# Patient Record
Sex: FEMALE | Race: BLACK OR AFRICAN AMERICAN | NOT HISPANIC OR LATINO | Employment: UNEMPLOYED | URBAN - METROPOLITAN AREA
[De-identification: names, ages, dates, MRNs, and addresses within clinical notes are randomized per-mention and may not be internally consistent; named-entity substitution may affect disease eponyms.]

---

## 2017-12-14 ENCOUNTER — TELEPHONE (OUTPATIENT)
Dept: GASTROENTEROLOGY | Facility: CLINIC | Age: 27
End: 2017-12-14

## 2017-12-14 NOTE — TELEPHONE ENCOUNTER
----- Message from Penelopeflynn Lanza sent at 12/14/2017  1:51 PM CST -----  Patients mom states that patient need to see the doctor/NP tomorrow for severe stomach pain.  Please call Leigha Pickering at [phone].

## 2017-12-14 NOTE — TELEPHONE ENCOUNTER
----- Message from Marlon Li sent at 12/14/2017  2:09 PM CST -----  Contact: pt's mother  She's calling in regards to having the pt worked into the ds schedule on tomorrow, [phone] (home)

## 2017-12-16 ENCOUNTER — HOSPITAL ENCOUNTER (OUTPATIENT)
Facility: HOSPITAL | Age: 27
Discharge: HOME | End: 2017-12-18
Attending: EMERGENCY MEDICINE | Admitting: FAMILY MEDICINE
Payer: MEDICAID

## 2017-12-16 DIAGNOSIS — R11.2 INTRACTABLE VOMITING WITH NAUSEA, UNSPECIFIED VOMITING TYPE: Primary | ICD-10-CM

## 2017-12-16 LAB
ALBUMIN SERPL BCP-MCNC: 4.4 G/DL
ALP SERPL-CCNC: 67 U/L
ALT SERPL W/O P-5'-P-CCNC: 19 U/L
AMPHET+METHAMPHET UR QL: NEGATIVE
ANION GAP SERPL CALC-SCNC: 11 MMOL/L
AST SERPL-CCNC: 20 U/L
BARBITURATES UR QL SCN>200 NG/ML: NEGATIVE
BASOPHILS # BLD AUTO: 0 K/UL
BASOPHILS NFR BLD: 0.3 %
BENZODIAZ UR QL SCN>200 NG/ML: NEGATIVE
BILIRUB SERPL-MCNC: 1.1 MG/DL
BUN SERPL-MCNC: 14 MG/DL
BZE UR QL SCN: NEGATIVE
CALCIUM SERPL-MCNC: 9.8 MG/DL
CANNABINOIDS UR QL SCN: NORMAL
CHLORIDE SERPL-SCNC: 105 MMOL/L
CO2 SERPL-SCNC: 22 MMOL/L
CREAT SERPL-MCNC: 1 MG/DL
CREAT UR-MCNC: 37.6 MG/DL
DIFFERENTIAL METHOD: ABNORMAL
EOSINOPHIL # BLD AUTO: 0 K/UL
EOSINOPHIL NFR BLD: 0.1 %
ERYTHROCYTE [DISTWIDTH] IN BLOOD BY AUTOMATED COUNT: 12.9 %
EST. GFR  (AFRICAN AMERICAN): >60 ML/MIN/1.73 M^2
EST. GFR  (NON AFRICAN AMERICAN): >60 ML/MIN/1.73 M^2
GLUCOSE SERPL-MCNC: 119 MG/DL
HCT VFR BLD AUTO: 40.3 %
HGB BLD-MCNC: 13.4 G/DL
LIPASE SERPL-CCNC: 50 U/L
LYMPHOCYTES # BLD AUTO: 1.2 K/UL
LYMPHOCYTES NFR BLD: 9.1 %
MCH RBC QN AUTO: 32.1 PG
MCHC RBC AUTO-ENTMCNC: 33.3 G/DL
MCV RBC AUTO: 97 FL
METHADONE UR QL SCN>300 NG/ML: NEGATIVE
MONOCYTES # BLD AUTO: 0.6 K/UL
MONOCYTES NFR BLD: 4.5 %
NEUTROPHILS # BLD AUTO: 10.9 K/UL
NEUTROPHILS NFR BLD: 86 %
OPIATES UR QL SCN: NEGATIVE
PCP UR QL SCN>25 NG/ML: NEGATIVE
PLATELET # BLD AUTO: 257 K/UL
PMV BLD AUTO: 8.5 FL
POTASSIUM SERPL-SCNC: 4 MMOL/L
PROT SERPL-MCNC: 7.6 G/DL
RBC # BLD AUTO: 4.18 M/UL
SODIUM SERPL-SCNC: 138 MMOL/L
TOXICOLOGY INFORMATION: NORMAL
WBC # BLD AUTO: 12.7 K/UL

## 2017-12-16 PROCEDURE — 36415 COLL VENOUS BLD VENIPUNCTURE: CPT

## 2017-12-16 PROCEDURE — G0378 HOSPITAL OBSERVATION PER HR: HCPCS

## 2017-12-16 PROCEDURE — 25000003 PHARM REV CODE 250: Performed by: NURSE PRACTITIONER

## 2017-12-16 PROCEDURE — 96375 TX/PRO/DX INJ NEW DRUG ADDON: CPT

## 2017-12-16 PROCEDURE — 63600175 PHARM REV CODE 636 W HCPCS: Performed by: EMERGENCY MEDICINE

## 2017-12-16 PROCEDURE — 85025 COMPLETE CBC W/AUTO DIFF WBC: CPT

## 2017-12-16 PROCEDURE — 63600175 PHARM REV CODE 636 W HCPCS: Performed by: FAMILY MEDICINE

## 2017-12-16 PROCEDURE — 80053 COMPREHEN METABOLIC PANEL: CPT

## 2017-12-16 PROCEDURE — 25000003 PHARM REV CODE 250: Performed by: EMERGENCY MEDICINE

## 2017-12-16 PROCEDURE — 96374 THER/PROPH/DIAG INJ IV PUSH: CPT

## 2017-12-16 PROCEDURE — 80307 DRUG TEST PRSMV CHEM ANLYZR: CPT

## 2017-12-16 PROCEDURE — 25000003 PHARM REV CODE 250: Performed by: FAMILY MEDICINE

## 2017-12-16 PROCEDURE — 83690 ASSAY OF LIPASE: CPT

## 2017-12-16 PROCEDURE — 99284 EMERGENCY DEPT VISIT MOD MDM: CPT

## 2017-12-16 PROCEDURE — 96361 HYDRATE IV INFUSION ADD-ON: CPT

## 2017-12-16 RX ORDER — METOCLOPRAMIDE HYDROCHLORIDE 5 MG/ML
10 INJECTION INTRAMUSCULAR; INTRAVENOUS
Status: COMPLETED | OUTPATIENT
Start: 2017-12-16 | End: 2017-12-16

## 2017-12-16 RX ORDER — LANOLIN ALCOHOL/MO/W.PET/CERES
800 CREAM (GRAM) TOPICAL
Status: DISCONTINUED | OUTPATIENT
Start: 2017-12-16 | End: 2017-12-18 | Stop reason: HOSPADM

## 2017-12-16 RX ORDER — POTASSIUM CHLORIDE 20 MEQ/15ML
60 SOLUTION ORAL
Status: DISCONTINUED | OUTPATIENT
Start: 2017-12-16 | End: 2017-12-18 | Stop reason: HOSPADM

## 2017-12-16 RX ORDER — FENTANYL CITRATE 50 UG/ML
50 INJECTION, SOLUTION INTRAMUSCULAR; INTRAVENOUS
Status: COMPLETED | OUTPATIENT
Start: 2017-12-16 | End: 2017-12-16

## 2017-12-16 RX ORDER — ACETAMINOPHEN 10 MG/ML
1000 INJECTION, SOLUTION INTRAVENOUS EVERY 8 HOURS
Status: DISCONTINUED | OUTPATIENT
Start: 2017-12-16 | End: 2017-12-18

## 2017-12-16 RX ORDER — PANTOPRAZOLE SODIUM 40 MG/1
40 TABLET, DELAYED RELEASE ORAL DAILY
Status: DISCONTINUED | OUTPATIENT
Start: 2017-12-17 | End: 2017-12-16

## 2017-12-16 RX ORDER — OMEPRAZOLE 40 MG/1
40 CAPSULE, DELAYED RELEASE ORAL DAILY
Status: ON HOLD | COMMUNITY
End: 2017-12-16

## 2017-12-16 RX ORDER — PANTOPRAZOLE SODIUM 40 MG/1
40 TABLET, DELAYED RELEASE ORAL 2 TIMES DAILY
Status: DISCONTINUED | OUTPATIENT
Start: 2017-12-16 | End: 2017-12-18 | Stop reason: HOSPADM

## 2017-12-16 RX ORDER — IBUPROFEN 200 MG
24 TABLET ORAL
Status: DISCONTINUED | OUTPATIENT
Start: 2017-12-16 | End: 2017-12-18 | Stop reason: HOSPADM

## 2017-12-16 RX ORDER — POTASSIUM CHLORIDE 20 MEQ/15ML
40 SOLUTION ORAL
Status: DISCONTINUED | OUTPATIENT
Start: 2017-12-16 | End: 2017-12-18 | Stop reason: HOSPADM

## 2017-12-16 RX ORDER — SODIUM CHLORIDE 9 MG/ML
INJECTION, SOLUTION INTRAVENOUS CONTINUOUS
Status: DISCONTINUED | OUTPATIENT
Start: 2017-12-16 | End: 2017-12-18 | Stop reason: HOSPADM

## 2017-12-16 RX ORDER — GLUCAGON 1 MG
1 KIT INJECTION
Status: DISCONTINUED | OUTPATIENT
Start: 2017-12-16 | End: 2017-12-18 | Stop reason: HOSPADM

## 2017-12-16 RX ORDER — ACETAMINOPHEN 325 MG/1
650 TABLET ORAL EVERY 8 HOURS PRN
Status: DISCONTINUED | OUTPATIENT
Start: 2017-12-16 | End: 2017-12-18 | Stop reason: HOSPADM

## 2017-12-16 RX ORDER — DIPHENHYDRAMINE HCL 25 MG
50 CAPSULE ORAL NIGHTLY PRN
Status: DISCONTINUED | OUTPATIENT
Start: 2017-12-16 | End: 2017-12-18 | Stop reason: HOSPADM

## 2017-12-16 RX ORDER — IBUPROFEN 200 MG
16 TABLET ORAL
Status: DISCONTINUED | OUTPATIENT
Start: 2017-12-16 | End: 2017-12-18 | Stop reason: HOSPADM

## 2017-12-16 RX ORDER — LORAZEPAM 2 MG/ML
1 INJECTION INTRAMUSCULAR
Status: COMPLETED | OUTPATIENT
Start: 2017-12-16 | End: 2017-12-16

## 2017-12-16 RX ORDER — DIPHENHYDRAMINE HYDROCHLORIDE 50 MG/ML
12.5 INJECTION INTRAMUSCULAR; INTRAVENOUS
Status: COMPLETED | OUTPATIENT
Start: 2017-12-16 | End: 2017-12-16

## 2017-12-16 RX ORDER — METOCLOPRAMIDE HYDROCHLORIDE 5 MG/ML
10 INJECTION INTRAMUSCULAR; INTRAVENOUS EVERY 6 HOURS PRN
Status: DISCONTINUED | OUTPATIENT
Start: 2017-12-16 | End: 2017-12-18 | Stop reason: HOSPADM

## 2017-12-16 RX ORDER — SUCRALFATE 1 G/1
1 TABLET ORAL
Status: DISCONTINUED | OUTPATIENT
Start: 2017-12-16 | End: 2017-12-18 | Stop reason: HOSPADM

## 2017-12-16 RX ORDER — ONDANSETRON 2 MG/ML
8 INJECTION INTRAMUSCULAR; INTRAVENOUS EVERY 8 HOURS PRN
Status: DISCONTINUED | OUTPATIENT
Start: 2017-12-16 | End: 2017-12-18 | Stop reason: HOSPADM

## 2017-12-16 RX ORDER — HYDROCODONE BITARTRATE AND ACETAMINOPHEN 5; 325 MG/1; MG/1
1 TABLET ORAL EVERY 4 HOURS PRN
Status: DISCONTINUED | OUTPATIENT
Start: 2017-12-16 | End: 2017-12-18 | Stop reason: HOSPADM

## 2017-12-16 RX ORDER — KETOROLAC TROMETHAMINE 30 MG/ML
30 INJECTION, SOLUTION INTRAMUSCULAR; INTRAVENOUS EVERY 6 HOURS PRN
Status: DISCONTINUED | OUTPATIENT
Start: 2017-12-16 | End: 2017-12-16

## 2017-12-16 RX ADMIN — SODIUM CHLORIDE 1000 ML: 0.9 INJECTION, SOLUTION INTRAVENOUS at 11:12

## 2017-12-16 RX ADMIN — ACETAMINOPHEN 1000 MG: 10 INJECTION, SOLUTION INTRAVENOUS at 09:12

## 2017-12-16 RX ADMIN — DIPHENHYDRAMINE HYDROCHLORIDE 50 MG: 25 CAPSULE ORAL at 10:12

## 2017-12-16 RX ADMIN — SUCRALFATE 1 G: 1 TABLET ORAL at 09:12

## 2017-12-16 RX ADMIN — DIPHENHYDRAMINE HYDROCHLORIDE 12.5 MG: 50 INJECTION, SOLUTION INTRAMUSCULAR; INTRAVENOUS at 11:12

## 2017-12-16 RX ADMIN — HYDROCODONE BITARTRATE AND ACETAMINOPHEN 1 TABLET: 5; 325 TABLET ORAL at 09:12

## 2017-12-16 RX ADMIN — PANTOPRAZOLE SODIUM 40 MG: 40 TABLET, DELAYED RELEASE ORAL at 09:12

## 2017-12-16 RX ADMIN — LORAZEPAM 1 MG: 2 INJECTION, SOLUTION INTRAMUSCULAR; INTRAVENOUS at 11:12

## 2017-12-16 RX ADMIN — SODIUM CHLORIDE: 0.9 INJECTION, SOLUTION INTRAVENOUS at 10:12

## 2017-12-16 RX ADMIN — ONDANSETRON 8 MG: 2 INJECTION INTRAMUSCULAR; INTRAVENOUS at 08:12

## 2017-12-16 RX ADMIN — METOCLOPRAMIDE 10 MG: 5 INJECTION, SOLUTION INTRAMUSCULAR; INTRAVENOUS at 11:12

## 2017-12-16 RX ADMIN — Medication 50 MCG: at 02:12

## 2017-12-16 NOTE — SUBJECTIVE & OBJECTIVE
Past Medical History:   Diagnosis Date    Anxiety     History of stomach ulcers        Past Surgical History:   Procedure Laterality Date    BREAST SURGERY      KNEE SURGERY      UPPER GASTROINTESTINAL ENDOSCOPY  2016    Dr. Hernández       Review of patient's allergies indicates:  No Known Allergies    No current facility-administered medications on file prior to encounter.      Current Outpatient Prescriptions on File Prior to Encounter   Medication Sig    esomeprazole (NEXIUM) 40 MG capsule Take 1 capsule (40 mg total) by mouth once daily.    hydrocodone-acetaminophen 7.5-325mg (NORCO) 7.5-325 mg per tablet Take 1 tablet by mouth every 4 (four) hours as needed for Pain.    pantoprazole (PROTONIX) 40 MG tablet Take 1 tablet (40 mg total) by mouth before breakfast.    promethazine (PHENERGAN) 25 MG tablet Take 1 tablet (25 mg total) by mouth every 6 (six) hours as needed for Nausea.     Family History     None        Social History Main Topics    Smoking status: Never Smoker    Smokeless tobacco: Never Used    Alcohol use Yes      Comment: occaisionally    Drug use: Unknown    Sexual activity: Not on file     Review of Systems   Constitutional: Negative for activity change, appetite change, chills, diaphoresis, fatigue and fever.   HENT: Negative for congestion, sinus pressure, sore throat and tinnitus.    Eyes: Negative for visual disturbance.   Respiratory: Negative for cough, chest tightness, shortness of breath and wheezing.    Cardiovascular: Positive for chest pain (substernal). Negative for palpitations and leg swelling.   Gastrointestinal: Positive for abdominal pain, nausea and vomiting. Negative for abdominal distention and diarrhea.   Endocrine: Negative for polydipsia, polyphagia and polyuria.   Genitourinary: Negative for dysuria.   Musculoskeletal: Negative for arthralgias and back pain.   Skin: Negative for rash and wound.   Neurological: Negative for dizziness, syncope, light-headedness  and headaches.   Psychiatric/Behavioral: Negative for confusion.     Objective:     Vital Signs (Most Recent):  Temp: 99.4 °F (37.4 °C) (12/16/17 1620)  Pulse: (!) 55 (12/16/17 1620)  Resp: 16 (12/16/17 1620)  BP: (!) 106/55 (12/16/17 1620)  SpO2: 98 % (12/16/17 1620) Vital Signs (24h Range):  Temp:  [97.8 °F (36.6 °C)-99.4 °F (37.4 °C)] 99.4 °F (37.4 °C)  Pulse:  [50-84] 55  Resp:  [16-20] 16  SpO2:  [96 %-100 %] 98 %  BP: (106-146)/(52-73) 106/55     Weight: 64.9 kg (143 lb)  Body mass index is 23.08 kg/m².    Physical Exam   Constitutional: She appears well-developed and well-nourished. She is cooperative.  Non-toxic appearance. She does not have a sickly appearance. She does not appear ill. No distress.   HENT:   Head: Normocephalic and atraumatic.   Right Ear: External ear normal.   Left Ear: External ear normal.   Nose: Nose normal.   Mouth/Throat: Uvula is midline, oropharynx is clear and moist and mucous membranes are normal.   Eyes: Conjunctivae, EOM and lids are normal. Pupils are equal, round, and reactive to light.   Neck: Trachea normal, normal range of motion and full passive range of motion without pain. Neck supple. No JVD present. No thyroid mass present.   Cardiovascular: Normal rate, regular rhythm, S1 normal, S2 normal and normal heart sounds.    Pulmonary/Chest: Effort normal and breath sounds normal.   Abdominal: Soft. Normal appearance and bowel sounds are normal. She exhibits no distension. There is no tenderness.   Musculoskeletal:        Right lower leg: She exhibits no edema.        Left lower leg: She exhibits no edema.   Neurological: She is alert. She has normal strength. No cranial nerve deficit or sensory deficit. She displays a negative Romberg sign.   Skin: Skin is intact. No cyanosis.   Psychiatric: She has a normal mood and affect. Her speech is normal and behavior is normal. Thought content normal. Cognition and memory are normal.   Nursing note and vitals  reviewed.        CRANIAL NERVES     CN III, IV, VI   Pupils are equal, round, and reactive to light.  Extraocular motions are normal.        Significant Labs: All pertinent labs within the past 24 hours have been reviewed.    Significant Imaging: none

## 2017-12-16 NOTE — HPI
Cynthia Jaimes is a 26 y.o. female with a PMHx of anxiety, gastritis and intermittent intractable nausea and vomiting who presents to the ED c/o nausea and vomiting. Her father states she moved to [place] and is in town visiting. She has previously had symptoms like this in the past but has not followed up with a gastroenterologist. She states she tried to get an appt in [facility] but there was not any availability for a couple months. She has presented to the ED several times this week with the same symptoms. She states her symptoms became worse this morning. This problem started a little over 1 year ago. She has continued to have symptoms daily since. Every morning after a bowel movement she has n/v or excessive dry heaving and severe burning in the middle of her chest (from her epigastric area to her throat). Her vomitus is bilious.  If she does not have a BM, she has bad belly pain until she goes. She denies nocturnal symptoms. Hot showers improve her symptoms. Her symptom then were attributed to her cannabis use. She has yet to stop her daily habit. Neither has she stopped momentarily to see if it would alleviate her symptoms. She states her marijuana use helps her to feel better. She denies EtoH use. She does not smoke cigarettes or use drugs.

## 2017-12-16 NOTE — ED NOTES
Here for eval of acute on chronic epigastric pain with assoc N&V (no diarrhea) - seen 2 days ago @ [facility]. Writhing/moaning on arrival

## 2017-12-16 NOTE — ED PROVIDER NOTES
Encounter Date: 12/16/2017    SCRIBE #1 NOTE: IMason, am scribing for, and in the presence of, Dr. Thomas.       History     Chief Complaint   Patient presents with    Abdominal Pain     Started this am, Hx of ulcers    Nausea       12/16/2017 11:23 AM     Chief complaint: nausea and vomiting      Cynthia Jaimes is a 26 y.o. female with a PMHx of anxiety and stomach ulcers and intermittent intractable nausea and vomiting who presents to the ED accompanied by her father c/o nausea and vomiting. Her father states she moved to [place] and is in town visiting. She has previously had symptoms like this in the past but has not followed up with a gastroenterologist. She states she tried to get an appt but there was not any availability for a couple months. She states her symptoms became worse this morning. She denies having any pain.      The history is provided by the patient and a parent. The history is limited by the condition of the patient.     Review of patient's allergies indicates:  No Known Allergies  Past Medical History:   Diagnosis Date    Anxiety     History of stomach ulcers      Past Surgical History:   Procedure Laterality Date    BREAST SURGERY      KNEE SURGERY      UPPER GASTROINTESTINAL ENDOSCOPY  2016    Dr. Hernández     Family History   Problem Relation Age of Onset    Colon cancer Neg Hx     Colon polyps Neg Hx     Crohn's disease Neg Hx     Ulcerative colitis Neg Hx     Stomach cancer Neg Hx     Esophageal cancer Neg Hx      Social History   Substance Use Topics    Smoking status: Never Smoker    Smokeless tobacco: Never Used    Alcohol use Yes      Comment: occaisionally     Review of Systems   Constitutional: Negative for fever.   HENT: Negative for sore throat.    Respiratory: Negative for shortness of breath.    Cardiovascular: Negative for chest pain.   Gastrointestinal: Positive for nausea and vomiting. Negative for abdominal pain.   Genitourinary: Negative for  dysuria and flank pain.   Musculoskeletal: Negative for back pain.   Skin: Negative for rash.   Neurological: Negative for weakness.   Hematological: Does not bruise/bleed easily.   All other systems reviewed and are negative.      Physical Exam     Initial Vitals [12/16/17 1102]   BP Pulse Resp Temp SpO2   111/65 (!) 50 20 97.8 °F (36.6 °C) 100 %      MAP       80.33         Physical Exam    Nursing note and vitals reviewed.  Constitutional: She appears well-developed and well-nourished. She is not diaphoretic. She appears distressed.   writhing and groaning on the bed   HENT:   Head: Normocephalic and atraumatic.   Eyes: EOM are normal.   Neck: Normal range of motion. Neck supple.   Cardiovascular: Normal rate, regular rhythm and normal heart sounds. Exam reveals no gallop and no friction rub.    No murmur heard.  Pulmonary/Chest: Breath sounds normal. No respiratory distress. She has no wheezes. She has no rhonchi. She has no rales.   Abdominal: She exhibits no distension. There is tenderness (epigastric). There is no rebound.   Mild epigastric   Musculoskeletal: Normal range of motion.   Neurological: She is alert and oriented to person, place, and time.   Skin: Skin is warm and dry.         ED Course   Procedures  Labs Reviewed   CBC W/ AUTO DIFFERENTIAL   COMPREHENSIVE METABOLIC PANEL   LIPASE             Medical Decision Making:   History:   Old Medical Records: I decided to obtain old medical records.  Clinical Tests:   Lab Tests: Ordered and Reviewed            Scribe Attestation:   Scribe #1: I performed the above scribed service and the documentation accurately describes the services I performed. I attest to the accuracy of the note.    I, Dr. Thomas, personally performed the services described in this documentation. All medical record entries made by the scribe were at my direction and in my presence.  I have reviewed the chart and agree that the record reflects my personal performance and is accurate  and complete.3:54 PM 12/16/2017            ED Course as of Dec 16 1553   Sat Dec 16, 2017   1300 Dr turner to admit for intractable NV, 3rd ER visit in 4 days  [EF]   1302 Abd soft non tender not peritoneal  [EF]      ED Course User Index  [EF] Francisco Thomas MD     Clinical Impression:   There were no encounter diagnoses.          26-year-old female with a history of anxiety and stomach ulcers and gastritis presents to the emergency department complaining of nausea and vomiting today.  Actively vomiting on arrival.  Retching, groaning and writhing on the gurney.  Symptoms have improved after IV meds in the emergency room.  In light of the fact this is her third visit in 4 days to the emergency room she'll be admitted.  If discharged she will be very likely just a bounce back again.  Abdominal exam is benign without any peritoneal findings.  Recent negative pregnancy test at [facility].                 Francisco Thomas MD  12/16/17 9069

## 2017-12-17 ENCOUNTER — ANESTHESIA (OUTPATIENT)
Dept: ENDOSCOPY | Facility: HOSPITAL | Age: 27
End: 2017-12-17
Payer: MEDICAID

## 2017-12-17 ENCOUNTER — ANESTHESIA EVENT (OUTPATIENT)
Dept: ENDOSCOPY | Facility: HOSPITAL | Age: 27
End: 2017-12-17
Payer: MEDICAID

## 2017-12-17 ENCOUNTER — SURGERY (OUTPATIENT)
Age: 27
End: 2017-12-17

## 2017-12-17 PROCEDURE — G0378 HOSPITAL OBSERVATION PER HR: HCPCS

## 2017-12-17 PROCEDURE — 63600175 PHARM REV CODE 636 W HCPCS: Performed by: NURSE PRACTITIONER

## 2017-12-17 PROCEDURE — 27201012 HC FORCEPS, HOT/COLD, DISP: Performed by: INTERNAL MEDICINE

## 2017-12-17 PROCEDURE — 37000008 HC ANESTHESIA 1ST 15 MINUTES: Performed by: INTERNAL MEDICINE

## 2017-12-17 PROCEDURE — 63600175 PHARM REV CODE 636 W HCPCS: Performed by: FAMILY MEDICINE

## 2017-12-17 PROCEDURE — 37000009 HC ANESTHESIA EA ADD 15 MINS: Performed by: INTERNAL MEDICINE

## 2017-12-17 PROCEDURE — D9220A PRA ANESTHESIA: Mod: ANES,,, | Performed by: ANESTHESIOLOGY

## 2017-12-17 PROCEDURE — 25000003 PHARM REV CODE 250: Performed by: FAMILY MEDICINE

## 2017-12-17 PROCEDURE — 00731 ANES UPR GI NDSC PX NOS: CPT | Performed by: INTERNAL MEDICINE

## 2017-12-17 PROCEDURE — 43239 EGD BIOPSY SINGLE/MULTIPLE: CPT | Mod: ,,, | Performed by: INTERNAL MEDICINE

## 2017-12-17 PROCEDURE — 88342 IMHCHEM/IMCYTCHM 1ST ANTB: CPT | Mod: 26,,, | Performed by: PATHOLOGY

## 2017-12-17 PROCEDURE — 88305 TISSUE EXAM BY PATHOLOGIST: CPT | Performed by: PATHOLOGY

## 2017-12-17 PROCEDURE — 99214 OFFICE O/P EST MOD 30 MIN: CPT | Mod: 25,,, | Performed by: INTERNAL MEDICINE

## 2017-12-17 PROCEDURE — 63600175 PHARM REV CODE 636 W HCPCS: Performed by: NURSE ANESTHETIST, CERTIFIED REGISTERED

## 2017-12-17 PROCEDURE — 43239 EGD BIOPSY SINGLE/MULTIPLE: CPT | Performed by: INTERNAL MEDICINE

## 2017-12-17 PROCEDURE — D9220A PRA ANESTHESIA: Mod: CRNA,,, | Performed by: NURSE ANESTHETIST, CERTIFIED REGISTERED

## 2017-12-17 PROCEDURE — 00740 HC ANESTHESIA EA ADD 15 MINS: CPT | Performed by: INTERNAL MEDICINE

## 2017-12-17 PROCEDURE — 00740 HC ANESTHESIA 1ST 15 MINUTES: CPT | Performed by: INTERNAL MEDICINE

## 2017-12-17 PROCEDURE — 25000003 PHARM REV CODE 250: Performed by: NURSE ANESTHETIST, CERTIFIED REGISTERED

## 2017-12-17 RX ORDER — PROPOFOL 10 MG/ML
VIAL (ML) INTRAVENOUS
Status: DISCONTINUED | OUTPATIENT
Start: 2017-12-17 | End: 2017-12-17

## 2017-12-17 RX ORDER — LIDOCAINE HYDROCHLORIDE 10 MG/ML
INJECTION, SOLUTION EPIDURAL; INFILTRATION; INTRACAUDAL; PERINEURAL
Status: COMPLETED
Start: 2017-12-17 | End: 2017-12-17

## 2017-12-17 RX ORDER — KETAMINE HYDROCHLORIDE 10 MG/ML
INJECTION, SOLUTION INTRAMUSCULAR; INTRAVENOUS
Status: DISCONTINUED | OUTPATIENT
Start: 2017-12-17 | End: 2017-12-17

## 2017-12-17 RX ORDER — PROPOFOL 10 MG/ML
INJECTION, EMULSION INTRAVENOUS
Status: COMPLETED
Start: 2017-12-17 | End: 2017-12-17

## 2017-12-17 RX ORDER — LIDOCAINE HYDROCHLORIDE 10 MG/ML
INJECTION, SOLUTION EPIDURAL; INFILTRATION; INTRACAUDAL; PERINEURAL
Status: DISCONTINUED | OUTPATIENT
Start: 2017-12-17 | End: 2017-12-17

## 2017-12-17 RX ORDER — KETAMINE HYDROCHLORIDE 100 MG/ML
INJECTION, SOLUTION INTRAMUSCULAR; INTRAVENOUS
Status: DISCONTINUED
Start: 2017-12-17 | End: 2017-12-18 | Stop reason: HOSPADM

## 2017-12-17 RX ADMIN — PROPOFOL 50 MG: 10 INJECTION, EMULSION INTRAVENOUS at 03:12

## 2017-12-17 RX ADMIN — PANTOPRAZOLE SODIUM 40 MG: 40 TABLET, DELAYED RELEASE ORAL at 08:12

## 2017-12-17 RX ADMIN — METOCLOPRAMIDE 10 MG: 5 INJECTION, SOLUTION INTRAMUSCULAR; INTRAVENOUS at 01:12

## 2017-12-17 RX ADMIN — PROPOFOL 30 MG: 10 INJECTION, EMULSION INTRAVENOUS at 03:12

## 2017-12-17 RX ADMIN — LIDOCAINE HYDROCHLORIDE: 20 SOLUTION ORAL; TOPICAL at 08:12

## 2017-12-17 RX ADMIN — ACETAMINOPHEN 1000 MG: 10 INJECTION, SOLUTION INTRAVENOUS at 05:12

## 2017-12-17 RX ADMIN — PROMETHAZINE HYDROCHLORIDE 25 MG: 25 INJECTION INTRAMUSCULAR; INTRAVENOUS at 06:12

## 2017-12-17 RX ADMIN — PROPOFOL 100 MG: 10 INJECTION, EMULSION INTRAVENOUS at 03:12

## 2017-12-17 RX ADMIN — SUCRALFATE 1 G: 1 TABLET ORAL at 05:12

## 2017-12-17 RX ADMIN — PROMETHAZINE HYDROCHLORIDE 12.5 MG: 25 INJECTION INTRAMUSCULAR; INTRAVENOUS at 01:12

## 2017-12-17 RX ADMIN — LIDOCAINE HYDROCHLORIDE 100 MG: 10 INJECTION, SOLUTION EPIDURAL; INFILTRATION; INTRACAUDAL; PERINEURAL at 03:12

## 2017-12-17 RX ADMIN — METOCLOPRAMIDE 10 MG: 5 INJECTION, SOLUTION INTRAMUSCULAR; INTRAVENOUS at 08:12

## 2017-12-17 RX ADMIN — METOCLOPRAMIDE 10 MG: 5 INJECTION, SOLUTION INTRAMUSCULAR; INTRAVENOUS at 10:12

## 2017-12-17 RX ADMIN — PROPOFOL 40 MG: 10 INJECTION, EMULSION INTRAVENOUS at 03:12

## 2017-12-17 RX ADMIN — KETAMINE HYDROCHLORIDE 20 MG: 10 INJECTION, SOLUTION INTRAMUSCULAR; INTRAVENOUS at 03:12

## 2017-12-17 RX ADMIN — ONDANSETRON 8 MG: 2 INJECTION INTRAMUSCULAR; INTRAVENOUS at 05:12

## 2017-12-17 NOTE — CONSULTS
25 yo female admitted with epigastric pain and N/V. Symptoms are chronic, dating back past year. Pt describes daily N/V in AM, usually assoc with upper abd pain. No bleeding. No fever or jaundice. Intermittent diarrhea. Weight stable. Not pregnant. Positive reflux symptoms. Positive dyspepsia and belching episodes. Similar symptoms last year, EGD 12/2016 gastritis. U/S 12/2016 negative. Pt taking prilosec without benefit. Smokes marijuana.     Past Medical History:   Diagnosis Date    Anxiety     History of stomach ulcers      Past Surgical History:   Procedure Laterality Date    BREAST SURGERY      KNEE SURGERY Bilateral     UPPER GASTROINTESTINAL ENDOSCOPY  2016    Dr. Hernández     Scheduled Meds:   acetaminophen  1,000 mg Intravenous Q8H    ketamine        pantoprazole  40 mg Oral BID    sucralfate  1 g Oral QID (AC & HS)     Continuous Infusions:   sodium chloride 0.9% Stopped (12/17/17 1607)     PRN Meds:.(pyxis) gi cocktail (mylanta 30 mL, lidocaine 2 % viscous 10 mL, dicyclomine 10 mL) 50 mL, acetaminophen, dextrose 50%, dextrose 50%, diphenhydrAMINE, glucagon (human recombinant), glucose, glucose, hydrocodone-acetaminophen 5-325mg, magnesium oxide, magnesium oxide, metoclopramide HCl, ondansetron, potassium chloride 10%, potassium chloride 10%, potassium chloride 10%, promethazine (PHENERGAN) IVPB  Review of patient's allergies indicates:  No Known Allergies  Social History     Social History    Marital status: Single     Spouse name: N/A    Number of children: N/A    Years of education: N/A     Occupational History    Not on file.     Social History Main Topics    Smoking status: Never Smoker    Smokeless tobacco: Never Used    Alcohol use Yes      Comment: occaisionally    Drug use: Unknown    Sexual activity: Not on file     Other Topics Concern    Not on file     Social History Narrative    No narrative on file     Family History   Problem Relation Age of Onset    Colon cancer Neg Hx      Colon polyps Neg Hx     Crohn's disease Neg Hx     Ulcerative colitis Neg Hx     Stomach cancer Neg Hx     Esophageal cancer Neg Hx      REVIEW OF SYSTEMS:   Constitutional: Negative for fever, appetite change and unexpected weight change.  HENT: Negative for sore throat and trouble swallowing.  Eyes: Negative for visual disturbance.  Respiratory: Negative for chest tightness, shortness of breath and wheezing.  Cardiovascular: Negative for chest pain.  Gastrointestinal:  as per HPI  Genitourinary: Negative for dysuria, frequency and hematuria.  Musculoskeletal: Negative for myalgias, joint swelling and arthralgias.  Skin: Negative for color change and rash.   Neurological: Negative for syncope, weakness and headaches.  Psychiatric/Behavioral: Negative for confusion.    PHYSICAL EXAMINATION:                                                        GENERAL:  Comfortable, in no acute distress.      SKIN: Non-jaundiced.                             HEENT EXAM:  Nonicteric.  No adenopathy.  Oropharynx is clear.               NECK:  Supple.                                                               LUNGS:  Clear.                                                               CARDIAC:  Regular rate and rhythm.  S1, S2.  No murmur.                      ABDOMEN:  Soft, positive bowel sounds, nontender.  No hepatosplenomegaly or masses.  No rebound or guarding.                                             EXTREMITIES:  No edema.     NEURO: CN II-XII intact; motor/sensory non-focal.    LABS: Reviewed by me.    RADIOLOGY: Reviewed by me.    IMP: 1. Chronic epigastric pain          2. Chronic N/V          3. Chronic GERD symptoms          4. Chronic dyspepsia          5. Hx anxiety    REC: 1. EGD            2. Abd/GB U/S            3. Further recommendations following above.

## 2017-12-17 NOTE — ANESTHESIA POSTPROCEDURE EVALUATION
"Anesthesia Post Evaluation    Patient: Cynthia Jaimes    Procedure(s) Performed: Procedure(s) (LRB):  ESOPHAGOGASTRODUODENOSCOPY (EGD) (N/A)    Final Anesthesia Type: general  Patient location during evaluation: PACU  Patient participation: Yes- Able to Participate  Level of consciousness: awake and alert and oriented  Post-procedure vital signs: reviewed and stable  Pain management: adequate  Airway patency: patent  PONV status at discharge: No PONV  Anesthetic complications: no      Cardiovascular status: blood pressure returned to baseline and stable  Respiratory status: unassisted and spontaneous ventilation  Hydration status: euvolemic  Follow-up not needed.        Visit Vitals  BP (!) 105/56   Pulse (!) 58   Temp 36.9 °C (98.4 °F) (Temporal)   Resp 16   Ht 5' 6" (1.676 m)   Wt 63.5 kg (140 lb)   LMP 12/10/2017 (Exact Date)   SpO2 98%   Breastfeeding? No   BMI 22.60 kg/m²       Pain/Alondra Score: Pain Assessment Performed: Yes (12/17/2017  3:31 PM)  Presence of Pain: denies (12/17/2017  4:02 PM)  Pain Rating Prior to Med Admin: 7 (12/17/2017  5:55 AM)  Pain Rating Post Med Admin: 8 (12/17/2017  9:16 AM)  Alondra Score: 9 (12/17/2017  4:02 PM)      "

## 2017-12-17 NOTE — SUBJECTIVE & OBJECTIVE
Interval History: stable overnight. The pain is somewhat better but not much. Made NPO by GI    Review of Systems   Constitutional: Negative for chills and fever.   Respiratory: Negative for shortness of breath.    Cardiovascular: Negative for chest pain.   Gastrointestinal: Positive for abdominal pain (burning) and nausea.   Genitourinary: Negative for dysuria.   Neurological: Negative for headaches.   Psychiatric/Behavioral: Negative for confusion.     Objective:     Vital Signs (Most Recent):  Temp: 98 °F (36.7 °C) (12/17/17 0759)  Pulse: (!) 51 (12/17/17 0759)  Resp: 18 (12/17/17 0759)  BP: (!) 141/90 (12/17/17 0759)  SpO2: 98 % (12/17/17 0759) Vital Signs (24h Range):  Temp:  [96.1 °F (35.6 °C)-99.4 °F (37.4 °C)] 98 °F (36.7 °C)  Pulse:  [51-84] 51  Resp:  [16-19] 18  SpO2:  [96 %-100 %] 98 %  BP: ()/(47-90) 141/90     Weight: 63.5 kg (140 lb)  Body mass index is 22.6 kg/m².    Intake/Output Summary (Last 24 hours) at 12/17/17 1133  Last data filed at 12/17/17 0600   Gross per 24 hour   Intake           1087.5 ml   Output                0 ml   Net           1087.5 ml      Physical Exam   Constitutional: She appears well-developed and well-nourished. No distress.   HENT:   Head: Normocephalic and atraumatic.   Eyes: Conjunctivae are normal.   Neck: Neck supple. No JVD present.   Cardiovascular: Normal rate, regular rhythm and normal heart sounds.    Pulmonary/Chest: Effort normal and breath sounds normal.   Abdominal: Soft. Bowel sounds are normal. She exhibits no distension. There is no tenderness.   Musculoskeletal: She exhibits no edema.   Neurological: She is alert.   Psychiatric: She has a normal mood and affect. Her behavior is normal.   Nursing note and vitals reviewed.      Significant Labs: All pertinent labs within the past 24 hours have been reviewed.    Significant Imaging: I have reviewed all pertinent imaging results/findings within the past 24 hours.

## 2017-12-17 NOTE — H&P
[facility] Medicine  History & Physical    Patient Name: Cynthia Jaimes  MRN: [id number]  Admission Date: 12/16/2017  Attending Physician: Criselda Cohen MD  Primary Care Provider: Alisia Low MD     Patient information was obtained from patient, past medical records and ER records.     Subjective:     Principal Problem:Intractable vomiting with nausea    Chief Complaint:   Chief Complaint   Patient presents with    Abdominal Pain     Started this am, Hx of ulcers    Nausea        HPI: Cynthia Jaimes is a 26 y.o. female with a PMHx of anxiety, gastritis and intermittent intractable nausea and vomiting who presents to the ED c/o nausea and vomiting. Her father states she moved to [place] and is in town visiting. She has previously had symptoms like this in the past but has not followed up with a gastroenterologist. She states she tried to get an appt in [facility] but there was not any availability for a couple months. She has presented to the ED several times this week with the same symptoms. She states her symptoms became worse this morning. This problem started a little over 1 year ago. She has continued to have symptoms daily since. Every morning after a bowel movement she has n/v or excessive dry heaving and severe burning in the middle of her chest (from her epigastric area to her throat). Her vomitus is bilious.  If she does not have a BM, she has bad belly pain until she goes. She denies nocturnal symptoms. Hot showers improve her symptoms. Her symptom then were attributed to her cannabis use. She has yet to stop her daily habit. Neither has she stopped momentarily to see if it would alleviate her symptoms. She states her marijuana use helps her to feel better. She denies EtoH use. She does not smoke cigarettes or use drugs.        Past Medical History:   Diagnosis Date    Anxiety     History of stomach ulcers        Past Surgical History:   Procedure  Laterality Date    BREAST SURGERY      KNEE SURGERY      UPPER GASTROINTESTINAL ENDOSCOPY  2016    Dr. Hernández       Review of patient's allergies indicates:  No Known Allergies    No current facility-administered medications on file prior to encounter.      Current Outpatient Prescriptions on File Prior to Encounter   Medication Sig    esomeprazole (NEXIUM) 40 MG capsule Take 1 capsule (40 mg total) by mouth once daily.    hydrocodone-acetaminophen 7.5-325mg (NORCO) 7.5-325 mg per tablet Take 1 tablet by mouth every 4 (four) hours as needed for Pain.    pantoprazole (PROTONIX) 40 MG tablet Take 1 tablet (40 mg total) by mouth before breakfast.    promethazine (PHENERGAN) 25 MG tablet Take 1 tablet (25 mg total) by mouth every 6 (six) hours as needed for Nausea.     Family History     None        Social History Main Topics    Smoking status: Never Smoker    Smokeless tobacco: Never Used    Alcohol use Yes      Comment: occaisionally    Drug use: Unknown    Sexual activity: Not on file     Review of Systems   Constitutional: Negative for activity change, appetite change, chills, diaphoresis, fatigue and fever.   HENT: Negative for congestion, sinus pressure, sore throat and tinnitus.    Eyes: Negative for visual disturbance.   Respiratory: Negative for cough, chest tightness, shortness of breath and wheezing.    Cardiovascular: Positive for chest pain (substernal). Negative for palpitations and leg swelling.   Gastrointestinal: Positive for abdominal pain, nausea and vomiting. Negative for abdominal distention and diarrhea.   Endocrine: Negative for polydipsia, polyphagia and polyuria.   Genitourinary: Negative for dysuria.   Musculoskeletal: Negative for arthralgias and back pain.   Skin: Negative for rash and wound.   Neurological: Negative for dizziness, syncope, light-headedness and headaches.   Psychiatric/Behavioral: Negative for confusion.     Objective:     Vital Signs (Most Recent):  Temp: 99.4  °F (37.4 °C) (12/16/17 1620)  Pulse: (!) 55 (12/16/17 1620)  Resp: 16 (12/16/17 1620)  BP: (!) 106/55 (12/16/17 1620)  SpO2: 98 % (12/16/17 1620) Vital Signs (24h Range):  Temp:  [97.8 °F (36.6 °C)-99.4 °F (37.4 °C)] 99.4 °F (37.4 °C)  Pulse:  [50-84] 55  Resp:  [16-20] 16  SpO2:  [96 %-100 %] 98 %  BP: (106-146)/(52-73) 106/55     Weight: 64.9 kg (143 lb)  Body mass index is 23.08 kg/m².    Physical Exam   Constitutional: She appears well-developed and well-nourished. She is cooperative.  Non-toxic appearance. She does not have a sickly appearance. She does not appear ill. No distress.   HENT:   Head: Normocephalic and atraumatic.   Right Ear: External ear normal.   Left Ear: External ear normal.   Nose: Nose normal.   Mouth/Throat: Uvula is midline, oropharynx is clear and moist and mucous membranes are normal.   Eyes: Conjunctivae, EOM and lids are normal. Pupils are equal, round, and reactive to light.   Neck: Trachea normal, normal range of motion and full passive range of motion without pain. Neck supple. No JVD present. No thyroid mass present.   Cardiovascular: Normal rate, regular rhythm, S1 normal, S2 normal and normal heart sounds.    Pulmonary/Chest: Effort normal and breath sounds normal.   Abdominal: Soft. Normal appearance and bowel sounds are normal. She exhibits no distension. There is no tenderness.   Musculoskeletal:        Right lower leg: She exhibits no edema.        Left lower leg: She exhibits no edema.   Neurological: She is alert. She has normal strength. No cranial nerve deficit or sensory deficit. She displays a negative Romberg sign.   Skin: Skin is intact. No cyanosis.   Psychiatric: She has a normal mood and affect. Her speech is normal and behavior is normal. Thought content normal. Cognition and memory are normal.   Nursing note and vitals reviewed.        CRANIAL NERVES     CN III, IV, VI   Pupils are equal, round, and reactive to light.  Extraocular motions are normal.         Significant Labs: All pertinent labs within the past 24 hours have been reviewed.    Significant Imaging: none    Assessment/Plan:     * Intractable vomiting with nausea    IV zofran, phenergan prn.   Clear liquid diet.          Gastritis    Likely acute on chronic. Benign physical exam.   PPI, carafate, GI cocktail.   GI consult.         Marijuana abuse    Recommend complete cessation. Patient verbalized understanding.             VTE Risk Mitigation         Ordered     Low Risk of VTE  Once      12/16/17 2912         Criselda Cohen MD  Department of Hospital Medicine   [facility]

## 2017-12-17 NOTE — PROGRESS NOTES
[facility]  Progress Note    Patient Name: Cynthia Jaimes  MRN: [id number]  Patient Class: OP- Observation   Admission Date: 12/16/2017  Length of Stay: 0 days  Attending Physician: Criselda Cohen MD  Primary Care Provider: Alisia Low MD    Subjective:     Principal Problem:Intractable vomiting with nausea    HPI:  Cynthia Jaimes is a 26 y.o. female with a PMHx of anxiety, gastritis and intermittent intractable nausea and vomiting who presents to the ED c/o nausea and vomiting. Her father states she moved to [place] and is in town visiting. She has previously had symptoms like this in the past but has not followed up with a gastroenterologist. She states she tried to get an appt in [facility] but there was not any availability for a couple months. She has presented to the ED several times this week with the same symptoms. She states her symptoms became worse this morning. This problem started a little over 1 year ago. She has continued to have symptoms daily since. Every morning after a bowel movement she has n/v or excessive dry heaving and severe burning in the middle of her chest (from her epigastric area to her throat). Her vomitus is bilious.  If she does not have a BM, she has bad belly pain until she goes. She denies nocturnal symptoms. Hot showers improve her symptoms. Her symptom then were attributed to her cannabis use. She has yet to stop her daily habit. Neither has she stopped momentarily to see if it would alleviate her symptoms. She states her marijuana use helps her to feel better. She denies EtoH use. She does not smoke cigarettes or use drugs.        Interval History: stable overnight. The pain is somewhat better but not much. Made NPO by GI    Review of Systems   Constitutional: Negative for chills and fever.   Respiratory: Negative for shortness of breath.    Cardiovascular: Negative for chest pain.   Gastrointestinal: Positive for  abdominal pain (burning) and nausea.   Genitourinary: Negative for dysuria.   Neurological: Negative for headaches.   Psychiatric/Behavioral: Negative for confusion.     Objective:     Vital Signs (Most Recent):  Temp: 98 °F (36.7 °C) (12/17/17 0759)  Pulse: (!) 51 (12/17/17 0759)  Resp: 18 (12/17/17 0759)  BP: (!) 141/90 (12/17/17 0759)  SpO2: 98 % (12/17/17 0759) Vital Signs (24h Range):  Temp:  [96.1 °F (35.6 °C)-99.4 °F (37.4 °C)] 98 °F (36.7 °C)  Pulse:  [51-84] 51  Resp:  [16-19] 18  SpO2:  [96 %-100 %] 98 %  BP: ()/(47-90) 141/90     Weight: 63.5 kg (140 lb)  Body mass index is 22.6 kg/m².    Intake/Output Summary (Last 24 hours) at 12/17/17 1133  Last data filed at 12/17/17 0600   Gross per 24 hour   Intake           1087.5 ml   Output                0 ml   Net           1087.5 ml      Physical Exam   Constitutional: She appears well-developed and well-nourished. No distress.   HENT:   Head: Normocephalic and atraumatic.   Eyes: Conjunctivae are normal.   Neck: Neck supple. No JVD present.   Cardiovascular: Normal rate, regular rhythm and normal heart sounds.    Pulmonary/Chest: Effort normal and breath sounds normal.   Abdominal: Soft. Bowel sounds are normal. She exhibits no distension. There is no tenderness.   Musculoskeletal: She exhibits no edema.   Neurological: She is alert.   Psychiatric: She has a normal mood and affect. Her behavior is normal.   Nursing note and vitals reviewed.      Significant Labs: All pertinent labs within the past 24 hours have been reviewed.    Significant Imaging: I have reviewed all pertinent imaging results/findings within the past 24 hours.    Assessment/Plan:      * Intractable vomiting with nausea    IV zofran, phenergan prn.   NPO          Gastritis    Likely acute on chronic. Benign physical exam.   PPI, carafate, GI cocktail.   GI consult.         Marijuana abuse    Recommend complete cessation. Patient verbalized understanding.             VTE Risk Mitigation          Ordered     Low Risk of VTE  Once      12/16/17 1639      Anticipate EGD today.       Criselda Cohen MD  Department of Hospital Medicine   [facility]

## 2017-12-17 NOTE — PROGRESS NOTES
Pts mom frustrated with how the daughter is feeling, daughter complains of constant nausea and esophageal pain, all prns given, Dr. Luis called to find out what time he would be coming to see pt, he stated he could scope pt in about an hour and to call out all personnel. Lorin nursing supervisor notified.

## 2017-12-17 NOTE — PROGRESS NOTES
Pt's father came to the nurses station requesting he pt be disconnected from her Iv to take her 6th shower.  Nurse disconnected pt and covered pt's PIV.  Pt's father assisted her to the shower and seated her in the shower chair.  Nurse instructed pt and father on the use of the call assist in the shower with verbalization of understanding.  Nurse will continue to monitor.

## 2017-12-17 NOTE — PLAN OF CARE
"Problem: Patient Care Overview  Goal: Plan of Care Review  Outcome: Ongoing (interventions implemented as appropriate)  Pt had a restless night free from fall or injury.  VSS with frequent c/o N&V.  Pt had sips of water and ice chips throughout the night.  PRN sleep aid given with moderate relief.  IVF initiated through a new PIV.  Pt had 5 showers during shift stating " only the water on my back helps me"  PRN medications given as ordered.  Bed side rails raised x2 with call bell within reach.  Pt was instructed to call for assistance OOB and verbalized an understanding.  Nurse rounded q2 hrs to ensure pt safety.      "

## 2017-12-17 NOTE — ANESTHESIA PREPROCEDURE EVALUATION
12/17/2017  Cynthia Jaimes is a 26 y.o., female.    Anesthesia Evaluation    I have reviewed the Patient Summary Reports.    I have reviewed the Nursing Notes.   I have reviewed the Medications.     Review of Systems  Anesthesia Hx:  No problems with previous Anesthesia    Social:  Non-Smoker    Cardiovascular:  Cardiovascular Normal     Pulmonary:  Pulmonary Normal    Renal/:  Renal/ Normal     Neurological:  Neurology Normal    Endocrine:  Endocrine Normal    Psych:   anxiety          Physical Exam  General:  Well nourished    Airway/Jaw/Neck:  Airway Findings: Mouth Opening: Normal Tongue: Normal  General Airway Assessment: Adult  Oropharynx Findings:  Mallampati: II  Improves to II with phonation.  TM Distance: Normal, at least 6 cm  Jaw/Neck Findings:  Neck ROM: Normal ROM     Eyes/Ears/Nose:  Eyes/Ears/Nose Findings:    Dental:  Dental Findings: In tact   Chest/Lungs:  Chest/Lungs Findings: Normal Respiratory Rate     Heart/Vascular:  Heart Findings: Rate: Normal  Rhythm: Regular Rhythm        Mental Status:  Mental Status Findings:  Cooperative, Alert and Oriented         Anesthesia Plan  Type of Anesthesia, risks & benefits discussed:  Anesthesia Type:  general  Patient's Preference:   Intra-op Monitoring Plan:   Intra-op Monitoring Plan Comments:   Post Op Pain Control Plan: multimodal analgesia  Post Op Pain Control Plan Comments:   Induction:   IV  Beta Blocker:  Patient is not currently on a Beta-Blocker (No further documentation required).       Informed Consent: Patient understands risks and agrees with Anesthesia plan.  Questions answered. Anesthesia consent signed with patient.  ASA Score: 2  emergent   Day of Surgery Review of History & Physical:  There are no significant changes.   H&P completed by Anesthesiologist.       Ready For Surgery From Anesthesia Perspective.

## 2017-12-17 NOTE — TRANSFER OF CARE
"Anesthesia Transfer of Care Note    Patient: Cynthia Jaimes    Procedure(s) Performed: Procedure(s) (LRB):  ESOPHAGOGASTRODUODENOSCOPY (EGD) (N/A)    Patient location: PACU    Anesthesia Type: general    Transport from OR: Transported from OR on room air with adequate spontaneous ventilation    Post pain: adequate analgesia    Post assessment: no apparent anesthetic complications    Post vital signs: stable    Level of consciousness: awake    Nausea/Vomiting: no nausea/vomiting    Complications: none    Transfer of care protocol was followed      Last vitals:   Visit Vitals  BP (!) 109/58 (BP Location: Left arm, Patient Position: Lying)   Pulse 62   Temp 36.9 °C (98.4 °F) (Temporal)   Resp 16   Ht 5' 6" (1.676 m)   Wt 63.5 kg (140 lb)   LMP 12/10/2017 (Exact Date)   SpO2 99%   Breastfeeding? No   BMI 22.60 kg/m²     "

## 2017-12-17 NOTE — H&P
History & Physical - Short Stay  Gastroenterology      SUBJECTIVE:     Procedure: EGD    Chief Complaint/Indication for Procedure: Epigastric abd pain, Reflux, N/V.    PTA Medications   Medication Sig    hydrocodone-acetaminophen 7.5-325mg (NORCO) 7.5-325 mg per tablet Take 1 tablet by mouth every 4 (four) hours as needed for Pain.    [DISCONTINUED] omeprazole (PRILOSEC) 40 MG capsule Take 40 mg by mouth once daily.    esomeprazole (NEXIUM) 40 MG capsule Take 1 capsule (40 mg total) by mouth once daily.    pantoprazole (PROTONIX) 40 MG tablet Take 1 tablet (40 mg total) by mouth before breakfast.    promethazine (PHENERGAN) 25 MG tablet Take 1 tablet (25 mg total) by mouth every 6 (six) hours as needed for Nausea.       Review of patient's allergies indicates:  No Known Allergies     Past Medical History:   Diagnosis Date    Anxiety     History of stomach ulcers      Past Surgical History:   Procedure Laterality Date    BREAST SURGERY      KNEE SURGERY Bilateral     UPPER GASTROINTESTINAL ENDOSCOPY  2016    Dr. Hernández     Family History   Problem Relation Age of Onset    Colon cancer Neg Hx     Colon polyps Neg Hx     Crohn's disease Neg Hx     Ulcerative colitis Neg Hx     Stomach cancer Neg Hx     Esophageal cancer Neg Hx      Social History   Substance Use Topics    Smoking status: Never Smoker    Smokeless tobacco: Never Used    Alcohol use Yes      Comment: occaisionally         OBJECTIVE:     Vital Signs (Most Recent)  Temp: 98.7 °F (37.1 °C) (12/17/17 1204)  Pulse: (!) 59 (12/17/17 1204)  Resp: 18 (12/17/17 1204)  BP: (!) 100/54 (12/17/17 1204)  SpO2: 96 % (12/17/17 1204)    Physical Exam:                                                       GENERAL:  Comfortable, in no acute distress.                                 HEENT EXAM:  Nonicteric.  No adenopathy.  Oropharynx is clear.               NECK:  Supple.                                                               LUNGS:  Clear.                                                                CARDIAC:  Regular rate and rhythm.  S1, S2.  No murmur.                      ABDOMEN:  Soft, positive bowel sounds, nontender.  No hepatosplenomegaly or masses.  No rebound or guarding.                                             EXTREMITIES:  No edema.     MENTAL STATUS:  Normal, alert and oriented.      ASSESSMENT/PLAN:     Assessment: Epigastric Pain, Reflux and N/V.    Plan: EGD    Anesthesia Plan: General    ASA Grade: ASA 2 - Patient with mild systemic disease with no functional limitations    MALLAMPATI SCORE:  I (soft palate, uvula, fauces, and tonsillar pillars visible)

## 2017-12-17 NOTE — PLAN OF CARE
SW met w/ pt and mother @ bedside for assessment.  Pt lives in [place] w/ her boyfriend.  She was in town visiting family in [facility].  Pt denies HH or use of DME.     12/17/17 1521   Discharge Assessment   Assessment Type Discharge Planning Assessment   Confirmed/corrected address and phone number on facesheet? Yes   Assessment information obtained from? Patient;Caregiver  (w/ mother @ bedside)   Prior to hospitilization cognitive status: Alert/Oriented   Prior to hospitalization functional status: Independent   Current cognitive status: Alert/Oriented   Current Functional Status: Independent   Lives With significant other   Able to Return to Prior Arrangements yes   Is patient able to care for self after discharge? Yes   Who are your caregiver(s) and their phone number(s)? mother:  Leigha Pickering [phone]   Patient's perception of discharge disposition home or selfcare   Readmission Within The Last 30 Days no previous admission in last 30 days   Patient currently being followed by outpatient case management? No   Patient currently receives any other outside agency services? No   Equipment Currently Used at Home none   Do you have any problems affording any of your prescribed medications? No   Is the patient taking medications as prescribed? yes   Does the patient have transportation home? Yes   Transportation Available family or friend will provide   Does the patient receive services at the [facility]mad[facility] Clinic? No   Discharge Plan A Home with family   Discharge Plan B Home with family   Patient/Family In Agreement With Plan yes

## 2017-12-17 NOTE — PLAN OF CARE
Pt awake, alert.  Vital signs stable, denies pain,  abd soft. No adverse effects of anesthesia noted. Transferred to room per wheelchair,  Report given to Melida

## 2017-12-18 ENCOUNTER — TELEPHONE (OUTPATIENT)
Dept: FAMILY MEDICINE | Facility: CLINIC | Age: 27
End: 2017-12-18

## 2017-12-18 VITALS
HEIGHT: 66 IN | BODY MASS INDEX: 22.5 KG/M2 | TEMPERATURE: 99 F | DIASTOLIC BLOOD PRESSURE: 81 MMHG | HEART RATE: 81 BPM | SYSTOLIC BLOOD PRESSURE: 142 MMHG | OXYGEN SATURATION: 97 % | RESPIRATION RATE: 16 BRPM | WEIGHT: 140 LBS

## 2017-12-18 PROCEDURE — 25000003 PHARM REV CODE 250: Performed by: NURSE PRACTITIONER

## 2017-12-18 PROCEDURE — 63600175 PHARM REV CODE 636 W HCPCS: Performed by: HOSPITALIST

## 2017-12-18 PROCEDURE — 99214 OFFICE O/P EST MOD 30 MIN: CPT | Mod: ,,, | Performed by: INTERNAL MEDICINE

## 2017-12-18 PROCEDURE — 25000003 PHARM REV CODE 250: Performed by: FAMILY MEDICINE

## 2017-12-18 PROCEDURE — G0378 HOSPITAL OBSERVATION PER HR: HCPCS

## 2017-12-18 PROCEDURE — 63600175 PHARM REV CODE 636 W HCPCS: Performed by: FAMILY MEDICINE

## 2017-12-18 RX ORDER — HALOPERIDOL 5 MG/ML
5 INJECTION INTRAMUSCULAR ONCE
Status: COMPLETED | OUTPATIENT
Start: 2017-12-18 | End: 2017-12-18

## 2017-12-18 RX ORDER — PAROXETINE 10 MG/1
10 TABLET, FILM COATED ORAL EVERY MORNING
Qty: 30 TABLET | Refills: 0 | Status: SHIPPED | OUTPATIENT
Start: 2017-12-18 | End: 2018-11-26

## 2017-12-18 RX ORDER — HALOPERIDOL 5 MG/1
5 TABLET ORAL 4 TIMES DAILY PRN
Qty: 30 TABLET | Refills: 0 | Status: SHIPPED | OUTPATIENT
Start: 2017-12-18 | End: 2018-11-26

## 2017-12-18 RX ORDER — HALOPERIDOL 5 MG/1
5 TABLET ORAL ONCE
Status: DISCONTINUED | OUTPATIENT
Start: 2017-12-18 | End: 2017-12-18

## 2017-12-18 RX ADMIN — HYDROCODONE BITARTRATE AND ACETAMINOPHEN 1 TABLET: 5; 325 TABLET ORAL at 01:12

## 2017-12-18 RX ADMIN — HALOPERIDOL LACTATE 5 MG: 5 INJECTION, SOLUTION INTRAMUSCULAR at 12:12

## 2017-12-18 RX ADMIN — LIDOCAINE HYDROCHLORIDE: 20 SOLUTION ORAL; TOPICAL at 12:12

## 2017-12-18 RX ADMIN — LIDOCAINE HYDROCHLORIDE: 20 SOLUTION ORAL; TOPICAL at 07:12

## 2017-12-18 RX ADMIN — PROMETHAZINE HYDROCHLORIDE 25 MG: 25 INJECTION INTRAMUSCULAR; INTRAVENOUS at 12:12

## 2017-12-18 RX ADMIN — SUCRALFATE 1 G: 1 TABLET ORAL at 12:12

## 2017-12-18 RX ADMIN — DIPHENHYDRAMINE HYDROCHLORIDE 50 MG: 25 CAPSULE ORAL at 02:12

## 2017-12-18 RX ADMIN — SODIUM CHLORIDE: 0.9 INJECTION, SOLUTION INTRAVENOUS at 03:12

## 2017-12-18 NOTE — PROGRESS NOTES
"Nurse rounded on pt to perform an assessment and administer pt's evening meds.  Pt refused her scheduled medication stating " I just don't want to take a chance". Pt denies pain or nausea at this time.  Pt's boyfriend is at bedside and assisted pt to the bathroom and back to bed.  Pt is resting quietly at this time with side rails raised x2 and call bell within reach.  Nurse will continue to monitor pt instructed to call for needs or assistance.  "

## 2017-12-18 NOTE — TELEPHONE ENCOUNTER
----- Message from Yadira Hess sent at 12/18/2017 12:49 PM CST -----  Contact: call pt at [phone]  Sooner appointment than the  can schedule.  Did you offer to schedule the next available appointment and put the patient on the wait list?:    When is the first available appointment: 03/2018  What is the nature of the appointment: hosptial follow up patiients discharge date is today 12/18/17  What visit type: hosp  Patient preference of timeframe to be scheduled:  With in 2 weeks  Comments: Patient has medicaid so I am unable to schedule with in the 2 weeks time frame as requested

## 2017-12-18 NOTE — PLAN OF CARE
Problem: Patient Care Overview  Goal: Plan of Care Review  Outcome: Ongoing (interventions implemented as appropriate)  Pt has had a restless night.  x2 PIV's infiltrated requiring new ones to be placed.  IVF infused with pt requesting to be disconnected frequently to take hot showers.  Pt finds sitting in the shower chair under hot hot water works best for stomach burning and nausea.  PRN antiemetics, pain medications and GI cocktails were given during the night.  Abd u/s to happen in a.m.  Pt to be kept NPO.  Bed side rails raised x2 with call bell and bedside table within reach.  Pt's boyfriend remained at bedside through the shift attentive to pt.  Nurse rounded q2 hrs to ensure to safety.

## 2017-12-18 NOTE — DISCHARGE INSTRUCTIONS
Thank you for choosing [facility] for your medical care. The primary doctor who is taking care of you at the time of your discharge is Monroe Joyce MD.     You were admitted to the hospital with Intractable vomiting with nausea.     Please note your discharge instructions, including diet/activity restrictions, follow-up appointments, and medication changes.  If you have any questions about your medical issues, prescriptions, or any other questions, please feel free to contact the [facility] Medicine Dept at [phone] and we will help.    If you are previously with Home health, outpatient PT/OT or under a therapy program, you are cleared to return to those programs.    Please direct all long term medication refills and follow up to your primary care provider, Alisia Low MD. Thank you again for letting us take care of your health care needs.

## 2017-12-18 NOTE — PROGRESS NOTES
Pt was updated on POC.  Nurse instructed pt to remain NPO except sips with her medications in preparation for an abd u/s in the a.m. Around 0800.  Pt verbalized and understanding.  A new PIV was placed at this time with IVF restarted.  Nurse will continue to monitor pt.

## 2017-12-18 NOTE — PROGRESS NOTES
Attempted to schedule pt's hospital follow up appointment however no time was available.  Plan is for the MD's nurse to call pt with the time.  Updated the AVS and pt's nurse Tamela.

## 2017-12-18 NOTE — PLAN OF CARE
Problem: Patient Care Overview  Goal: Plan of Care Review  Outcome: Ongoing (interventions implemented as appropriate)  Pt free of injury, able to call for assistance when needed, EGD done today with Dr. Luis, egd showed nothing. Pt with constant nausea, vomitting and pain all day. Zofran, reglan, and promethazine given with no relief. Pt has not been hooked to iv fluids bc she takes bath1-2 times an hour. Dr. Cohen is aware. Pt complains of being hungry but has severe pain and nausea immediately following any oral intake. Will continue to monitor pt.

## 2017-12-18 NOTE — PLAN OF CARE
12/18/17 1253   Final Note   Assessment Type Final Discharge Note   Discharge Disposition Home   What phone number can be called within the next 1-3 days to see how you are doing after discharge? ([phone])   Hospital Follow Up  Appt(s) scheduled? No  (MD did not have an available spot in 2 weeks.  plan for the MD's nurse to call pt to give her the appointment marielena. )

## 2017-12-18 NOTE — PROGRESS NOTES
Discharge instructions, follow up appt, prescription meds and ER s/s reviewed with pt, verbalize understanding. IV access d/c'd, pressure dressing applied, cathter intact, site asymptomatic

## 2017-12-20 NOTE — DISCHARGE SUMMARY
[facility] Hospital Medicine  Discharge Summary      Patient Name: Cynthia Jaimes  MRN: [id number]  Admission Date: 12/16/2017  Hospital Length of Stay: 0 days  Discharge Date and Time: 12/18/2017  2:10 PM  Attending Physician: No att. providers found   Discharging Provider: Monroe Joyce MD  Primary Care Provider: Alisia Low MD      HPI:   Cynthia Jaimse is a 26 y.o. female with a PMHx of anxiety, gastritis and intermittent intractable nausea and vomiting who presents to the ED c/o nausea and vomiting. Her father states she moved to [place] and is in town visiting. She has previously had symptoms like this in the past but has not followed up with a gastroenterologist. She states she tried to get an appt in [facility] but there was not any availability for a couple months. She has presented to the ED several times this week with the same symptoms. She states her symptoms became worse this morning. This problem started a little over 1 year ago. She has continued to have symptoms daily since. Every morning after a bowel movement she has n/v or excessive dry heaving and severe burning in the middle of her chest (from her epigastric area to her throat). Her vomitus is bilious.  If she does not have a BM, she has bad belly pain until she goes. She denies nocturnal symptoms. Hot showers improve her symptoms. Her symptom then were attributed to her cannabis use. She has yet to stop her daily habit. Neither has she stopped momentarily to see if it would alleviate her symptoms. She states her marijuana use helps her to feel better. She denies EtoH use. She does not smoke cigarettes or use drugs.        Procedure(s) (LRB):  ESOPHAGOGASTRODUODENOSCOPY (EGD) (N/A)      Hospital Course:   Patient is a 26-year-old who was admitted to the hospital with intractable nausea and vomiting.  Patient underwent workup for gastroenterology and hospital medicine was found to have primary gastritis,  likely secondary and related to cannabis usage.  Her per my discussions with the patient's family she uses cannabis approximately 4-6 times per day.  Patient was counseled heavily against cannabis usage and she was ruled out to have any other signs of acute gastrointestinal illness.  She was discharged home on antiemetics and and proton pump inhibitors.  Patient was stable at her symptomatic baseline at time of discharge.     Consults:   Consults         Status Ordering Provider     Inpatient consult to Gastroenterology  Once     Provider:  MD Thomas Aguilera ADELAIDE          No new Assessment & Plan notes have been filed under this hospital service since the last note was generated.  Service: Hospital Medicine    Final Active Diagnoses:    Diagnosis Date Noted POA    PRINCIPAL PROBLEM:  Intractable vomiting with nausea [R11.2] 11/30/2016 Yes    Marijuana abuse [F12.10] 12/01/2016 Yes    Gastritis [K29.70] 12/01/2016 Yes      Problems Resolved During this Admission:    Diagnosis Date Noted Date Resolved POA       Discharged Condition: stable    Disposition: Home or Self Care    Follow Up:  Follow-up Information     Alisia Low MD In 2 weeks.    Specialty:  Family Medicine  Why:  hospital follow up appointment -  MD nurse will call pt with the appointment  Contact information:  [address]  [address]  [facility] [id number] [phone]                 Patient Instructions:     Diet general     Activity as tolerated     Call MD for:  temperature >100.4     Call MD for:  persistent nausea and vomiting or diarrhea     Call MD for:  severe uncontrolled pain         Significant Diagnostic Studies: Labs: BMP: No results for input(s): GLU, NA, K, CL, CO2, BUN, CREATININE, CALCIUM, MG in the last 48 hours. and CBC No results for input(s): WBC, HGB, HCT, PLT in the last 48 hours.    Pending Diagnostic Studies:     None         Medications:  Reconciled Home  Medications:   Discharge Medication List as of 12/18/2017  1:02 PM      START taking these medications    Details   haloperidol (HALDOL) 5 MG tablet Take 1 tablet (5 mg total) by mouth 4 (four) times daily as needed (nausea)., Starting Mon 12/18/2017, Until Tue 12/18/2018, Normal      paroxetine (PAXIL) 10 MG tablet Take 1 tablet (10 mg total) by mouth every morning., Starting Mon 12/18/2017, Until Tue 12/18/2018, Normal         CONTINUE these medications which have NOT CHANGED    Details   hydrocodone-acetaminophen 7.5-325mg (NORCO) 7.5-325 mg per tablet Take 1 tablet by mouth every 4 (four) hours as needed for Pain., Starting Tue 12/12/2017, Print      esomeprazole (NEXIUM) 40 MG capsule Take 1 capsule (40 mg total) by mouth once daily., Starting Tue 12/12/2017, Until Wed 12/12/2018, Normal      promethazine (PHENERGAN) 25 MG tablet Take 1 tablet (25 mg total) by mouth every 6 (six) hours as needed for Nausea., Starting Tue 12/12/2017, Normal         STOP taking these medications       omeprazole (PRILOSEC) 40 MG capsule Comments:   Reason for Stopping:         pantoprazole (PROTONIX) 40 MG tablet Comments:   Reason for Stopping:               Indwelling Lines/Drains at time of discharge:   Lines/Drains/Airways          No matching active lines, drains, or airways          Time spent on the discharge of patient: 32 minutes  Patient was seen and examined on the date of discharge and determined to be suitable for discharge.         Monroe Joyce MD  Department of Hospital Medicine  [facility]

## 2017-12-20 NOTE — HOSPITAL COURSE
Patient is a 26-year-old who was admitted to the hospital with intractable nausea and vomiting.  Patient underwent workup for gastroenterology and hospital medicine was found to have primary gastritis, likely secondary and related to cannabis usage.  Her per my discussions with the patient's family she uses cannabis approximately 4-6 times per day.  Patient was counseled heavily against cannabis usage and she was ruled out to have any other signs of acute gastrointestinal illness.  She was discharged home on antiemetics and and proton pump inhibitors.  Patient was stable at her symptomatic baseline at time of discharge.

## 2018-11-22 ENCOUNTER — HOSPITAL ENCOUNTER (EMERGENCY)
Facility: HOSPITAL | Age: 28
Discharge: HOME | End: 2018-11-22
Attending: EMERGENCY MEDICINE
Payer: MEDICAID

## 2018-11-22 VITALS
BODY MASS INDEX: 22.5 KG/M2 | WEIGHT: 140 LBS | HEART RATE: 66 BPM | RESPIRATION RATE: 19 BRPM | TEMPERATURE: 98 F | HEIGHT: 66 IN | OXYGEN SATURATION: 97 % | DIASTOLIC BLOOD PRESSURE: 58 MMHG | SYSTOLIC BLOOD PRESSURE: 99 MMHG

## 2018-11-22 DIAGNOSIS — F12.90 CANNABINOID HYPEREMESIS SYNDROME: Primary | ICD-10-CM

## 2018-11-22 DIAGNOSIS — R11.2 CANNABINOID HYPEREMESIS SYNDROME: Primary | ICD-10-CM

## 2018-11-22 LAB
ALBUMIN SERPL BCP-MCNC: 4.6 G/DL
ALP SERPL-CCNC: 61 U/L
ALT SERPL W/O P-5'-P-CCNC: 23 U/L
ANION GAP SERPL CALC-SCNC: 11 MMOL/L
AST SERPL-CCNC: 24 U/L
B-HCG UR QL: NEGATIVE
BACTERIA #/AREA URNS HPF: ABNORMAL /HPF
BASOPHILS # BLD AUTO: 0 K/UL
BASOPHILS NFR BLD: 0.1 %
BILIRUB SERPL-MCNC: 1.1 MG/DL
BILIRUB UR QL STRIP: NEGATIVE
BUN SERPL-MCNC: 14 MG/DL
CALCIUM SERPL-MCNC: 10 MG/DL
CHLORIDE SERPL-SCNC: 105 MMOL/L
CLARITY UR: CLEAR
CO2 SERPL-SCNC: 19 MMOL/L
COLOR UR: YELLOW
CREAT SERPL-MCNC: 0.9 MG/DL
CTP QC/QA: YES
DIFFERENTIAL METHOD: ABNORMAL
EOSINOPHIL # BLD AUTO: 0 K/UL
EOSINOPHIL NFR BLD: 0 %
ERYTHROCYTE [DISTWIDTH] IN BLOOD BY AUTOMATED COUNT: 12.5 %
EST. GFR  (AFRICAN AMERICAN): >60 ML/MIN/1.73 M^2
EST. GFR  (NON AFRICAN AMERICAN): >60 ML/MIN/1.73 M^2
GLUCOSE SERPL-MCNC: 130 MG/DL
GLUCOSE UR QL STRIP: NEGATIVE
HCT VFR BLD AUTO: 38.3 %
HGB BLD-MCNC: 13 G/DL
HGB UR QL STRIP: ABNORMAL
KETONES UR QL STRIP: ABNORMAL
LEUKOCYTE ESTERASE UR QL STRIP: NEGATIVE
LIPASE SERPL-CCNC: 21 U/L
LYMPHOCYTES # BLD AUTO: 0.7 K/UL
LYMPHOCYTES NFR BLD: 6.1 %
MCH RBC QN AUTO: 32.1 PG
MCHC RBC AUTO-ENTMCNC: 33.9 G/DL
MCV RBC AUTO: 95 FL
MICROSCOPIC COMMENT: ABNORMAL
MONOCYTES # BLD AUTO: 0.3 K/UL
MONOCYTES NFR BLD: 2.3 %
NEUTROPHILS # BLD AUTO: 11.2 K/UL
NEUTROPHILS NFR BLD: 91.5 %
NITRITE UR QL STRIP: NEGATIVE
PH UR STRIP: 8 [PH] (ref 5–8)
PLATELET # BLD AUTO: 243 K/UL
PMV BLD AUTO: 8.3 FL
POTASSIUM SERPL-SCNC: 4 MMOL/L
PROT SERPL-MCNC: 7.7 G/DL
PROT UR QL STRIP: NEGATIVE
RBC # BLD AUTO: 4.05 M/UL
RBC #/AREA URNS HPF: 5 /HPF (ref 0–4)
SODIUM SERPL-SCNC: 135 MMOL/L
SP GR UR STRIP: 1.01 (ref 1–1.03)
SQUAMOUS #/AREA URNS HPF: 10 /HPF
URN SPEC COLLECT METH UR: ABNORMAL
UROBILINOGEN UR STRIP-ACNC: NEGATIVE EU/DL
WBC # BLD AUTO: 12.2 K/UL
WBC #/AREA URNS HPF: 2 /HPF (ref 0–5)

## 2018-11-22 PROCEDURE — 96361 HYDRATE IV INFUSION ADD-ON: CPT

## 2018-11-22 PROCEDURE — 99284 EMERGENCY DEPT VISIT MOD MDM: CPT | Mod: 25

## 2018-11-22 PROCEDURE — 85025 COMPLETE CBC W/AUTO DIFF WBC: CPT

## 2018-11-22 PROCEDURE — 81025 URINE PREGNANCY TEST: CPT | Performed by: EMERGENCY MEDICINE

## 2018-11-22 PROCEDURE — 36415 COLL VENOUS BLD VENIPUNCTURE: CPT

## 2018-11-22 PROCEDURE — 83690 ASSAY OF LIPASE: CPT

## 2018-11-22 PROCEDURE — 80053 COMPREHEN METABOLIC PANEL: CPT

## 2018-11-22 PROCEDURE — 96365 THER/PROPH/DIAG IV INF INIT: CPT

## 2018-11-22 PROCEDURE — 96375 TX/PRO/DX INJ NEW DRUG ADDON: CPT

## 2018-11-22 PROCEDURE — 63600175 PHARM REV CODE 636 W HCPCS: Performed by: EMERGENCY MEDICINE

## 2018-11-22 PROCEDURE — 81000 URINALYSIS NONAUTO W/SCOPE: CPT

## 2018-11-22 PROCEDURE — 25000003 PHARM REV CODE 250: Performed by: EMERGENCY MEDICINE

## 2018-11-22 RX ORDER — CAPSAICIN 0.03 G/100G
CREAM TOPICAL 2 TIMES DAILY
Status: DISCONTINUED | OUTPATIENT
Start: 2018-11-22 | End: 2018-11-23 | Stop reason: HOSPADM

## 2018-11-22 RX ORDER — CAPSAICIN 0.03 G/100G
CREAM TOPICAL 2 TIMES DAILY
Status: DISCONTINUED | OUTPATIENT
Start: 2018-11-22 | End: 2018-11-22

## 2018-11-22 RX ORDER — ONDANSETRON 2 MG/ML
4 INJECTION INTRAMUSCULAR; INTRAVENOUS
Status: COMPLETED | OUTPATIENT
Start: 2018-11-22 | End: 2018-11-22

## 2018-11-22 RX ORDER — DIPHENHYDRAMINE HYDROCHLORIDE 50 MG/ML
25 INJECTION INTRAMUSCULAR; INTRAVENOUS
Status: COMPLETED | OUTPATIENT
Start: 2018-11-22 | End: 2018-11-22

## 2018-11-22 RX ORDER — ONDANSETRON 4 MG/1
4 TABLET, ORALLY DISINTEGRATING ORAL EVERY 8 HOURS PRN
Qty: 12 TABLET | Refills: 0 | Status: SHIPPED | OUTPATIENT
Start: 2018-11-22 | End: 2018-11-26

## 2018-11-22 RX ORDER — HALOPERIDOL 5 MG/ML
2.5 INJECTION INTRAMUSCULAR
Status: COMPLETED | OUTPATIENT
Start: 2018-11-22 | End: 2018-11-22

## 2018-11-22 RX ADMIN — CAPSAICIN: 0.25 CREAM TOPICAL at 07:11

## 2018-11-22 RX ADMIN — DIPHENHYDRAMINE HYDROCHLORIDE 25 MG: 50 INJECTION INTRAMUSCULAR; INTRAVENOUS at 07:11

## 2018-11-22 RX ADMIN — SODIUM CHLORIDE 1000 ML: 0.9 INJECTION, SOLUTION INTRAVENOUS at 07:11

## 2018-11-22 RX ADMIN — HALOPERIDOL LACTATE 2.5 MG: 5 INJECTION, SOLUTION INTRAMUSCULAR at 07:11

## 2018-11-22 RX ADMIN — ONDANSETRON 4 MG: 2 INJECTION INTRAMUSCULAR; INTRAVENOUS at 07:11

## 2018-11-22 RX ADMIN — SODIUM CHLORIDE, SODIUM LACTATE, POTASSIUM CHLORIDE, AND CALCIUM CHLORIDE 1000 ML: .6; .31; .03; .02 INJECTION, SOLUTION INTRAVENOUS at 08:11

## 2018-11-23 NOTE — ED PROVIDER NOTES
"The Encounter Date: 11/22/2018    SCRIBE #1 NOTE: I, Nick Suarez, am scribing for, and in the presence of, Dr. Lala.       History     Chief Complaint   Patient presents with    Abdominal Pain     positive for N/V x1 day... abdominal pain x1 day...     Time seen by provider: 6:59 PM on 11/22/2018      Cynthia Jaimes is a 27 y.o. female with a PMHx of anxiety and stomach ulcers who presents to the ED for further evaluation of vomiting that started 1 day ago. Per mother, the patient has had cannabinoid hyperemesis several times previously. The patient endorses smoking marijuana recently and has some abdominal cramping and vomiting. She states that the vomiting has been constant since this am. Mother relays that "she has nothing more to come up, so she is just gagging." Patient admits that the she started on her menstrual cycle today. The mother relays that the patient was paleShe denies chest pain, SOB, cough, congestion, fever, and headache. The patient denies any other complaints.       The history is provided by the patient and a parent.     Review of patient's allergies indicates:  No Known Allergies  Past Medical History:   Diagnosis Date    Anxiety     History of stomach ulcers      Past Surgical History:   Procedure Laterality Date    BREAST SURGERY      ESOPHAGOGASTRODUODENOSCOPY (EGD) N/A 12/17/2017    [facility]formed [facility] at [facility]    ESOPHAGOGASTRODUODENOSCOPY (EGD) N/A 12/1/2016    Performed by Oliver Joseph [facility] at [facility]    KNEE SURGERY Bilateral     UPPER GASTROINTESTINAL ENDOSCOPY  2016    Dr. Joseph     Family History   Problem Relation Age of Onset    Colon cancer Neg Hx     Colon polyps Neg Hx     Crohn's disease Neg Hx     Ulcerative colitis Neg Hx     Stomach cancer Neg Hx     Esophageal cancer Neg Hx      Social History     Tobacco Use    Smoking status: Never Smoker    Smokeless tobacco: Never Used   Substance Use Topics    Alcohol use: Yes     " Comment: occaisionally    Drug use: Not on file     Review of Systems   Constitutional: Negative for fever.   HENT: Negative for sore throat.    Respiratory: Negative for shortness of breath.    Cardiovascular: Negative for chest pain.   Gastrointestinal: Positive for abdominal pain, nausea and vomiting.   Genitourinary: Negative for dysuria.   Musculoskeletal: Negative for myalgias.   Skin: Negative for color change.   Neurological: Negative for headaches.       Physical Exam     Initial Vitals [11/22/18 1847]   BP Pulse Resp Temp SpO2   130/87 60 19 97.9 °F (36.6 °C) 95 %      MAP       --         Physical Exam    Nursing note and vitals reviewed.  Constitutional: She appears well-developed and well-nourished. She is not diaphoretic. No distress.   HENT:   Head: Normocephalic and atraumatic.   Eyes: EOM are normal. Pupils are equal, round, and reactive to light.   Neck: Normal range of motion. Neck supple.   Cardiovascular: Normal rate, regular rhythm, normal heart sounds and intact distal pulses. Exam reveals no gallop and no friction rub.    No murmur heard.  Pulmonary/Chest: Breath sounds normal. No respiratory distress. She has no wheezes. She has no rhonchi. She has no rales.   Abdominal: Soft. Bowel sounds are normal. There is no tenderness. There is no rebound and no guarding.   Musculoskeletal: Normal range of motion.   Neurological: She is alert and oriented to person, place, and time.   Skin: Skin is warm and dry.   Psychiatric: She has a normal mood and affect. Her behavior is normal. Judgment and thought content normal.         ED Course   Procedures  Labs Reviewed   URINALYSIS, REFLEX TO URINE CULTURE - Abnormal; Notable for the following components:       Result Value    Ketones, UA 2+ (*)     Occult Blood UA 2+ (*)     All other components within normal limits    Narrative:     Preferred Collection Type->Urine, Clean Catch   COMPREHENSIVE METABOLIC PANEL - Abnormal; Notable for the following  components:    Sodium 135 (*)     CO2 19 (*)     Glucose 130 (*)     Total Bilirubin 1.1 (*)     All other components within normal limits   CBC W/ AUTO DIFFERENTIAL - Abnormal; Notable for the following components:    MCH 32.1 (*)     MPV 8.3 (*)     Gran # (ANC) 11.2 (*)     Lymph # 0.7 (*)     Gran% 91.5 (*)     Lymph% 6.1 (*)     Mono% 2.3 (*)     All other components within normal limits   URINALYSIS MICROSCOPIC - Abnormal; Notable for the following components:    RBC, UA 5 (*)     Bacteria, UA Few (*)     All other components within normal limits    Narrative:     Preferred Collection Type->Urine, Clean Catch   LIPASE   POCT URINE PREGNANCY          Imaging Results    None          Medical Decision Making:   History:   Old Medical Records: I decided to obtain old medical records.  Initial Assessment:   27-year-old female presented with nausea and vomiting.  Differential Diagnosis:   InitialDifferential diagnosis included but not limited to gastritis, UTI, and marijuana use.  Clinical Tests:   Lab Tests: Ordered and Reviewed  ED Management:  The patient was emergently evaluated in the emergency department, her evaluation was significant for a young female with epigastric tenderness noted. The patient's labs showed no acute abnormalities.  The patient was aggressively treated IV fluids, IV Zofran, IV Haldol, IV Benadryl, and capsaicin cream.  The patient reports improvement in her symptoms after treatment.  The etiology of her symptoms is likely her continued marijuana usage.  She has been instructed that she needs to stop her marijuana usage.  She is stable for discharge to home.  She will be referred to outpatient substance abuse services and will be discharged home on ODT Zofran.            Scribe Attestation:   Scribe #1: I performed the above scribed service and the documentation accurately describes the services I performed. I attest to the accuracy of the note.        I, Dr. Alexey Lala, personally  performed the services described in this documentation. All medical record entries made by the scribe were at my direction and in my presence.  I have reviewed the chart and agree that the record reflects my personal performance and is accurate and complete. Alexey Lala MD.  10:24 PM 11/22/2018          Clinical Impression:   The encounter diagnosis was Cannabinoid hyperemesis syndrome.      Disposition:   Disposition: Discharged  Condition: Stable                        Alexey Lala MD  11/22/18 1162

## 2018-11-23 NOTE — ED NOTES
Upon discharge, patient is AAOx4, no cardiac or respiratory complications. Follow up care and  Medications have been reviewed with patient and has been instructed to return to the ER if needed. Patient verbalized understanding and ambulated to the lobby without difficulty. AYDIN TANNER.

## 2018-11-23 NOTE — ED NOTES
Presents to the ER with c/o burning abdominal pain with nausea and emesis. Mother who is at the bedside reports that patient flew in from [facility] today and has had emesis since arrival secondary to smoking marijuana. Patient has been seen in the ED in the past for the same complaint secondary to smoking marijuana. Mother states that the patient looks pale to her. Patient was supposed to follow up with a GI doctor in [facility], but states that she is unable to get an appointment. Mucous membranes are pink and moist. Skin is warm, dry and intact. Lungs are clear bilaterally, respirations are regular and unlabored. Denies cough, congestion, rhinorrhea or SOB. BS active x4, no tenderness with palpation, abd is soft and not distended. Denies any appetite or activity change. S1S2, capillary refill is < 2 seconds. Denies dysuria, difficulty urinating, frequency, numbness, tingling or weakness. CIRO PERRIN

## 2019-01-11 NOTE — PROGRESS NOTES
26 year old  came thru ER admit to room 404. Awake alert, v/s stable c/o pain abdominal. DX. Intractable vomiting with nausea. Emesis x1 since admit. Liquid moderate amount bile in color. Dr. Cohen here to see the patient, new orders noted.   RX sent to pharmacy.  Dose corrected to what is in legacy epic vs what was reconciled incorrectly.